# Patient Record
Sex: MALE | Race: WHITE | NOT HISPANIC OR LATINO | Employment: FULL TIME | URBAN - NONMETROPOLITAN AREA
[De-identification: names, ages, dates, MRNs, and addresses within clinical notes are randomized per-mention and may not be internally consistent; named-entity substitution may affect disease eponyms.]

---

## 2017-07-27 ENCOUNTER — TRANSCRIBE ORDERS (OUTPATIENT)
Dept: NUTRITION | Facility: HOSPITAL | Age: 52
End: 2017-07-27

## 2017-07-27 ENCOUNTER — TRANSCRIBE ORDERS (OUTPATIENT)
Dept: ADMINISTRATIVE | Facility: HOSPITAL | Age: 52
End: 2017-07-27

## 2017-07-27 DIAGNOSIS — R55 SYNCOPE AND COLLAPSE: Primary | ICD-10-CM

## 2017-08-08 ENCOUNTER — HOSPITAL ENCOUNTER (OUTPATIENT)
Dept: CARDIOLOGY | Facility: HOSPITAL | Age: 52
Discharge: HOME | End: 2017-08-08
Attending: INTERNAL MEDICINE | Admitting: INTERNAL MEDICINE

## 2017-08-08 ENCOUNTER — HOSPITAL ENCOUNTER (OUTPATIENT)
Dept: CARDIOLOGY | Facility: HOSPITAL | Age: 52
Discharge: HOME | End: 2017-08-08
Attending: INTERNAL MEDICINE

## 2017-08-08 VITALS — DIASTOLIC BLOOD PRESSURE: 72 MMHG | SYSTOLIC BLOOD PRESSURE: 135 MMHG | HEART RATE: 71 BPM

## 2017-08-08 DIAGNOSIS — R55 SYNCOPE AND COLLAPSE: ICD-10-CM

## 2017-08-08 PROCEDURE — 25010000002 PERFLUTREN 6.52 MG/ML SUSPENSION: Performed by: INTERNAL MEDICINE

## 2017-08-08 PROCEDURE — 93352 ADMIN ECG CONTRAST AGENT: CPT | Performed by: INTERNAL MEDICINE

## 2017-08-08 PROCEDURE — 93225 XTRNL ECG REC<48 HRS REC: CPT

## 2017-08-08 PROCEDURE — 93226 XTRNL ECG REC<48 HR SCAN A/R: CPT

## 2017-08-08 PROCEDURE — 93017 CV STRESS TEST TRACING ONLY: CPT

## 2017-08-08 PROCEDURE — 93350 STRESS TTE ONLY: CPT | Performed by: INTERNAL MEDICINE

## 2017-08-08 PROCEDURE — 93018 CV STRESS TEST I&R ONLY: CPT | Performed by: INTERNAL MEDICINE

## 2017-08-08 PROCEDURE — C8928 TTE W OR W/O FOL W/CON,STRES: HCPCS

## 2017-08-08 RX ADMIN — PERFLUTREN 8.48 MG: 6.52 INJECTION, SUSPENSION INTRAVENOUS at 09:09

## 2017-08-09 LAB
BH CV STRESS BP STAGE 1: NORMAL
BH CV STRESS BP STAGE 2: NORMAL
BH CV STRESS BP STAGE 3: NORMAL
BH CV STRESS DURATION MIN STAGE 1: 3
BH CV STRESS DURATION MIN STAGE 2: 3
BH CV STRESS DURATION MIN STAGE 3: 1
BH CV STRESS DURATION SEC STAGE 1: 0
BH CV STRESS DURATION SEC STAGE 2: 0
BH CV STRESS DURATION SEC STAGE 3: 19
BH CV STRESS GRADE STAGE 1: 10
BH CV STRESS GRADE STAGE 2: 12
BH CV STRESS GRADE STAGE 3: 14
BH CV STRESS HR STAGE 1: 114
BH CV STRESS HR STAGE 2: 137
BH CV STRESS HR STAGE 3: 146
BH CV STRESS METS STAGE 1: 5
BH CV STRESS METS STAGE 2: 7.5
BH CV STRESS METS STAGE 3: 10
BH CV STRESS PROTOCOL 1: NORMAL
BH CV STRESS RECOVERY BP: NORMAL MMHG
BH CV STRESS RECOVERY HR: 85 BPM
BH CV STRESS SPEED STAGE 1: 1.7
BH CV STRESS SPEED STAGE 2: 2.5
BH CV STRESS SPEED STAGE 3: 3.4
BH CV STRESS STAGE 1: 1
BH CV STRESS STAGE 2: 2
BH CV STRESS STAGE 3: 3
MAXIMAL PREDICTED HEART RATE: 170 BPM
PERCENT MAX PREDICTED HR: 85.88 %
STRESS BASELINE BP: NORMAL MMHG
STRESS BASELINE HR: 71 BPM
STRESS PERCENT HR: 101 %
STRESS POST ESTIMATED WORKLOAD: 10 METS
STRESS POST EXERCISE DUR MIN: 7 MIN
STRESS POST EXERCISE DUR SEC: 19 SEC
STRESS POST PEAK BP: NORMAL MMHG
STRESS POST PEAK HR: 146 BPM
STRESS TARGET HR: 145 BPM

## 2017-08-11 PROCEDURE — 93227 XTRNL ECG REC<48 HR R&I: CPT | Performed by: INTERNAL MEDICINE

## 2023-01-16 ENCOUNTER — TELEPHONE (OUTPATIENT)
Dept: OTOLARYNGOLOGY | Facility: CLINIC | Age: 58
End: 2023-01-16
Payer: COMMERCIAL

## 2023-01-16 NOTE — TELEPHONE ENCOUNTER
Called to notify the pt of their appt with Otolaryngology (Jc Montano, APRN) 02/07/2023 at 3:30 PM. The pt answered and was given the information.

## 2023-02-07 ENCOUNTER — OFFICE VISIT (OUTPATIENT)
Dept: OTOLARYNGOLOGY | Facility: CLINIC | Age: 58
End: 2023-02-07
Payer: COMMERCIAL

## 2023-02-07 VITALS
TEMPERATURE: 98.2 F | HEIGHT: 73 IN | DIASTOLIC BLOOD PRESSURE: 76 MMHG | RESPIRATION RATE: 16 BRPM | HEART RATE: 77 BPM | BODY MASS INDEX: 33.53 KG/M2 | WEIGHT: 253 LBS | SYSTOLIC BLOOD PRESSURE: 148 MMHG

## 2023-02-07 DIAGNOSIS — H91.92 DECREASED HEARING OF LEFT EAR: ICD-10-CM

## 2023-02-07 DIAGNOSIS — H92.12 OTORRHEA OF LEFT EAR: Primary | ICD-10-CM

## 2023-02-07 PROCEDURE — 92504 EAR MICROSCOPY EXAMINATION: CPT | Performed by: NURSE PRACTITIONER

## 2023-02-07 PROCEDURE — 99203 OFFICE O/P NEW LOW 30 MIN: CPT | Performed by: NURSE PRACTITIONER

## 2023-02-07 RX ORDER — FLUTICASONE PROPIONATE 50 MCG
2 SPRAY, SUSPENSION (ML) NASAL DAILY
Qty: 16 G | Refills: 11 | Status: CANCELLED | OUTPATIENT
Start: 2023-02-07

## 2023-02-07 RX ORDER — CIPROFLOXACIN AND DEXAMETHASONE 3; 1 MG/ML; MG/ML
4 SUSPENSION/ DROPS AURICULAR (OTIC) 4 TIMES DAILY
Qty: 7.5 ML | Refills: 0 | Status: SHIPPED | OUTPATIENT
Start: 2023-02-07

## 2023-02-07 RX ORDER — AZELASTINE 1 MG/ML
2 SPRAY, METERED NASAL 2 TIMES DAILY
Qty: 30 ML | Refills: 11 | Status: CANCELLED | OUTPATIENT
Start: 2023-02-07

## 2023-02-07 NOTE — PROGRESS NOTES
YOB: 1965  Location: [facility]  Location Address: [address] [place] [address], [facility], [address] [place] [address]  Location Phone: [phone]    Chief Complaint   Patient presents with   • Otitis Media     Left ear in December. Had antibiotic but fluid is still on ear.     Has had 6 left ear infections since getting hearing aides in 2018.    • Tinnitus       History of Present Illness  Marquis Baron is a 57 y.o. male.  Marquis Baron is here for evaluation of ENT complaints. The patient has had problems with left ear fullness, ear pressure, and muffled hearing. The patient has had mild to moderate symptoms. The symptoms have been present for the last several years. There have been no identified factors that aggravate the symptoms. There have been no factors that have improved the symptoms.    He reports that he has had 6 ear infections of the left ear since . He reports that it will get clogged during these times. The most recent being 2022. He took two rounds of oral antibiotics but does not feel that the fullness has improved.      He does currently have hearing aids at home.    Past Medical History:   Diagnosis Date   • Diabetes mellitus (HCC)    • HL (hearing loss) 10/01/2018   • Sleep apnea 2009   • Tinnitus 2005       History reviewed. No pertinent surgical history.    No outpatient medications have been marked as taking for the 23 encounter (Office Visit) with Jc Montano APRN.       Sulfa antibiotics    Family History   Problem Relation Age of Onset   • Heart disease Mother    • Diabetes Mother    • Cancer Mother    • Hypertension Mother    • Stroke Mother    • Cancer Father        Social History     Socioeconomic History   • Marital status:    Tobacco Use   • Smoking status: Never   • Smokeless tobacco: Never   Vaping Use   • Vaping Use: Never used   Substance and Sexual Activity   • Alcohol use: Never   • Drug use: Never       Review of Systems    Constitutional: Negative.    HENT: Positive for hearing loss.         Admits ear fullness   Respiratory: Negative.    Cardiovascular: Negative.    Gastrointestinal: Negative.    Genitourinary: Negative.    Neurological: Negative.        Vitals:    02/07/23 1530   BP: 148/76   Pulse: 77   Resp: 16   Temp: 98.2 °F (36.8 °C)       Body mass index is 33.38 kg/m².    Objective     Physical Exam  Vitals reviewed.   Constitutional:       Appearance: Normal appearance. He is obese.   HENT:      Head: Normocephalic and atraumatic.      Right Ear: Tympanic membrane, ear canal and external ear normal. Decreased hearing noted.      Left Ear: External ear normal. Decreased hearing noted. Drainage present.      Ears:      Comments: Bilateral hearing aids  Left ear canal with moderate mucoid drainage-unable to completley visualize left TM     Nose: Nose normal.      Mouth/Throat:      Lips: Pink.      Mouth: Mucous membranes are moist.      Pharynx: Oropharynx is clear. Uvula midline.   Musculoskeletal:      Cervical back: Full passive range of motion without pain.   Neurological:      Mental Status: He is alert.   Psychiatric:         Behavior: Behavior is cooperative.       Ear Microscopy    Date/Time: 2/7/2023 4:10 PM  Performed by: Jc Montano APRN  Authorized by: Jc Montano APRN     Ear examination was performed utilizing binocular microscopy.  Right auricle:   normal:   Left auricle:   normal:   Left ear canal:   drainage. drainage details: moderate, mucoid.   Left tympanic membrane:   unable to completely visualize.     Post-procedure details:     Ciprodex was applied to the ear canal.    The procedure was tolerated well, no immediate complications.      Assessment & Plan   Problems Addressed this Visit    None  Visit Diagnoses     Otorrhea of left ear    -  Primary    Decreased hearing of left ear          Diagnoses       Codes Comments    Otorrhea of left ear    -  Primary ICD-10-CM: H92.12  ICD-9-CM: 388.60      Decreased hearing of left ear     ICD-10-CM: H91.92  ICD-9-CM: 389.9         * Surgery not found *  Orders Placed This Encounter   Procedures   • $ Binocular Microscopy     This order was created via procedure documentation     Order Specific Question:   Release to patient     Answer:   Routine Release     ciprodex to the left ear as directed  Recommend hearing test once cleared  Call with any new/worsening problems or concerns    Return in about 4 weeks (around 3/7/2023) for Recheck.       Patient Instructions   Ciprodex to the left ear as directed  Recommend hearing test once cleared  Call with any new/worsening problems or concerns    CONTACT INFORMATION:  The main office phone number is [phone]. For emergencies after hours and on weekends, this number will convert over to our answering service and the on call provider will answer. Please try to keep non emergent phone calls/ questions to office hours 9am-5pm Monday through Friday.      [employer]  As an alternative, you can sign up and use the Epic MyChart system for more direct and quicker access for non emergent questions/ problems.  [employer] allows you to send messages to your doctor, view your test results, renew your prescriptions, schedule appointments, and more. To sign up, go to [employer] and click on the Sign Up Now link in the New User? box. Enter your [employer] Activation Code exactly as it appears below along with the last four digits of your Social Security Number and your Date of Birth () to complete the sign-up process. If you do not sign up before the expiration date, you must request a new code.     [employer] Activation Code: Activation code not generated  Current [employer] Status: Active     If you have questions, you can email [email or web address] or call [phone] to talk to our [employer] staff. Remember, [employer] is NOT to be used for urgent needs. For medical emergencies, dial 911.     IF YOU SMOKE OR  USE TOBACCO PLEASE READ THE FOLLOWING:  Why is smoking bad for me?  Smoking increases the risk of heart disease, lung disease, vascular disease, stroke, and cancer. If you smoke, STOP!        IF YOU SMOKE OR USE TOBACCO PLEASE READ THE FOLLOWING:  Why is smoking bad for me?  Smoking increases the risk of heart disease, lung disease, vascular disease, stroke, and cancer. If you smoke, STOP!     For more information:  Quit Now [place]  1-800-QUIT-NOW  https://[place].quitlogix.org/en-US/

## 2023-02-07 NOTE — PATIENT INSTRUCTIONS
Ciprodex to the left ear as directed  Recommend hearing test once cleared  Call with any new/worsening problems or concerns    CONTACT INFORMATION:  The main office phone number is [phone]. For emergencies after hours and on weekends, this number will convert over to our answering service and the on call provider will answer. Please try to keep non emergent phone calls/ questions to office hours 9am-5pm Monday through Friday.      [employer]  As an alternative, you can sign up and use the Epic MyChart system for more direct and quicker access for non emergent questions/ problems.  [employer] allows you to send messages to your doctor, view your test results, renew your prescriptions, schedule appointments, and more. To sign up, go to [employer] and click on the Sign Up Now link in the New User? box. Enter your [employer] Activation Code exactly as it appears below along with the last four digits of your Social Security Number and your Date of Birth () to complete the sign-up process. If you do not sign up before the expiration date, you must request a new code.     [employer] Activation Code: Activation code not generated  Current [employer] Status: Active     If you have questions, you can email [email or web address] or call [phone] to talk to our [employer] staff. Remember, [employer] is NOT to be used for urgent needs. For medical emergencies, dial 911.     IF YOU SMOKE OR USE TOBACCO PLEASE READ THE FOLLOWING:  Why is smoking bad for me?  Smoking increases the risk of heart disease, lung disease, vascular disease, stroke, and cancer. If you smoke, STOP!        IF YOU SMOKE OR USE TOBACCO PLEASE READ THE FOLLOWING:  Why is smoking bad for me?  Smoking increases the risk of heart disease, lung disease, vascular disease, stroke, and cancer. If you smoke, STOP!     For more information:  Quit Now [place]  -QUIT-NOW  https://[place].quitlogix.org/en-US/

## 2023-02-27 RX ORDER — FLUTICASONE PROPIONATE 50 MCG
2 SPRAY, SUSPENSION (ML) NASAL DAILY
Qty: 16 G | Refills: 11 | Status: SHIPPED | OUTPATIENT
Start: 2023-02-27

## 2023-02-27 RX ORDER — AZELASTINE 1 MG/ML
2 SPRAY, METERED NASAL 2 TIMES DAILY
Qty: 30 ML | Refills: 11 | Status: SHIPPED | OUTPATIENT
Start: 2023-02-27

## 2023-08-07 RX ORDER — AZELASTINE 1 MG/ML
2 SPRAY, METERED NASAL 2 TIMES DAILY
Qty: 30 ML | Refills: 11 | Status: SHIPPED | OUTPATIENT
Start: 2023-08-07

## 2023-08-07 RX ORDER — FLUTICASONE PROPIONATE 50 MCG
2 SPRAY, SUSPENSION (ML) NASAL DAILY
Qty: 16 G | Refills: 11 | Status: SHIPPED | OUTPATIENT
Start: 2023-08-07

## 2023-09-13 ENCOUNTER — OFFICE VISIT (OUTPATIENT)
Dept: OTOLARYNGOLOGY | Facility: CLINIC | Age: 58
End: 2023-09-13
Payer: COMMERCIAL

## 2023-09-13 ENCOUNTER — PROCEDURE VISIT (OUTPATIENT)
Dept: OTOLARYNGOLOGY | Facility: CLINIC | Age: 58
End: 2023-09-13
Payer: COMMERCIAL

## 2023-09-13 VITALS
TEMPERATURE: 98.4 F | HEIGHT: 73 IN | BODY MASS INDEX: 31.14 KG/M2 | HEART RATE: 77 BPM | WEIGHT: 235 LBS | DIASTOLIC BLOOD PRESSURE: 76 MMHG | SYSTOLIC BLOOD PRESSURE: 137 MMHG

## 2023-09-13 DIAGNOSIS — H90.A21 SENSORINEURAL HEARING LOSS (SNHL) OF RIGHT EAR WITH RESTRICTED HEARING OF LEFT EAR: ICD-10-CM

## 2023-09-13 DIAGNOSIS — H92.12 OTORRHEA OF LEFT EAR: ICD-10-CM

## 2023-09-13 DIAGNOSIS — H93.13 TINNITUS OF BOTH EARS: ICD-10-CM

## 2023-09-13 DIAGNOSIS — H90.A32 MIXED CONDUCTIVE AND SENSORINEURAL HEARING LOSS OF LEFT EAR WITH RESTRICTED HEARING OF RIGHT EAR: Primary | ICD-10-CM

## 2023-09-13 DIAGNOSIS — H91.92 DECREASED HEARING OF LEFT EAR: Primary | ICD-10-CM

## 2023-09-13 RX ORDER — CIPROFLOXACIN AND DEXAMETHASONE 3; 1 MG/ML; MG/ML
4 SUSPENSION/ DROPS AURICULAR (OTIC) 4 TIMES DAILY
Qty: 7.5 ML | Refills: 0 | Status: SHIPPED | OUTPATIENT
Start: 2023-09-13

## 2023-09-13 RX ORDER — MULTIVIT WITH MINERALS/LUTEIN
250 TABLET ORAL DAILY
COMMUNITY

## 2023-09-13 RX ORDER — ERGOCALCIFEROL 1.25 MG/1
50000 CAPSULE ORAL WEEKLY
COMMUNITY

## 2023-09-13 NOTE — PROGRESS NOTES
YOB: 1965  Location: [facility]  Location Address: [address] [place] [address], [facility] [address], [address] [place] [address]  Location Phone: [phone]    Chief Complaint   Patient presents with    Ear Problem     Pt complains of some fullness and that his left ear feel clogged since root canal       History of Present Illness  Marquis Baron is a 58 y.o. male.  Marquis Baron is here for follow up of ENT complaints. The patient has had problems with left ear fullness and decreased hearing   Patient states he was diagnosed by pcp with ear infection in December and symptoms did not clear with antibiotics. He was seen here and found to have significant mucoid drainage   He used nasal sprays x 2 weeks then stopped    Procedure visit with Rosa Haddad AUD (2023)      Past Medical History:   Diagnosis Date    Diabetes mellitus     HL (hearing loss) 10/01/2018    Sleep apnea 2009    Tinnitus 2005       Past Surgical History:   Procedure Laterality Date    DENTAL PROCEDURE      wisdon teeth and root canal       Outpatient Medications Marked as Taking for the 23 encounter (Office Visit) with Rani Frazier APRN   Medication Sig Dispense Refill    ciprofloxacin-dexAMETHasone (CIPRODEX) 0.3-0.1 % otic suspension Administer 4 drops into the left ear 4 (Four) Times a Day. 7.5 mL 0    Testosterone 20 % cream Use.      vitamin C (ASCORBIC ACID) 250 MG tablet Take 1 tablet by mouth Daily.      vitamin D (ERGOCALCIFEROL) 1.25 MG (23971 UT) capsule capsule Take 1 capsule by mouth 1 (One) Time Per Week.      Zinc Sulfate (ZINC 15 PO) Take  by mouth.         Ciprofloxacin, Rosuvastatin, and Sulfa antibiotics    Family History   Problem Relation Age of Onset    Heart disease Mother     Diabetes Mother     Cancer Mother     Hypertension Mother     Stroke Mother     Cancer Father        Social History     Socioeconomic History    Marital status:    Tobacco Use    Smoking status: Never    Smokeless  tobacco: Never   Vaping Use    Vaping Use: Never used   Substance and Sexual Activity    Alcohol use: Never    Drug use: Never       Review of Systems   Constitutional: Negative.    HENT:  Positive for ear discharge, ear pain and hearing loss.      Vitals:    09/13/23 1348   BP: 137/76   Pulse: 77   Temp: 98.4 °F (36.9 °C)       Body mass index is 31 kg/m².    Objective     Physical Exam  Vitals reviewed.   Constitutional:       Appearance: Normal appearance. He is obese.   HENT:      Head: Normocephalic.      Right Ear: Tympanic membrane, ear canal and external ear normal.      Ears:      Comments: Left ear with cerumen and squamous debris       Nose: Nose normal.      Mouth/Throat:      Lips: Pink.      Mouth: Mucous membranes are moist.      Pharynx: Uvula midline.   Neurological:      Mental Status: He is alert.     Ear Microscopy with Left Cerumen Removal    Date/Time: 9/14/2023 1:12 PM  Performed by: Rani Frazier APRN  Authorized by: Rani Frazier APRN     Ear examination was performed utilizing binocular microscopy.  Right auricle:   normal:   Right ear canal:   normal:   Right tympanic membrane:   normal:   Left ear canal:   impacted cerumen, squamous debris.     Procedure:    Cerumen was removed from the left ear with a curette and a suction.   Post-procedure details:     Inspection after the procedure revealed macerated skin.    No medication was applied to the ear canal.    The procedure was tolerated well, no immediate complications.  Comments:      Cerumen partially removed  Tm partially visualized  Macerated and edematous canal       Dr. Prieto has examined and assessed the patient and agrees with current treatment plan     Assessment & Plan   Diagnoses and all orders for this visit:    1. Otorrhea of left ear (Primary)  -     Comprehensive Hearing Test; Future    2. Decreased hearing of left ear  -     Comprehensive Hearing Test; Future    Other orders  -     ciprofloxacin-dexAMETHasone  (CIPRODEX) 0.3-0.1 % otic suspension; Administer 4 drops into the left ear 4 (Four) Times a Day.  Dispense: 7.5 mL; Refill: 0  -     Cancel: Ear Cerumen Removal  -     $ Binocular Microscopy      * Surgery not found *  Orders Placed This Encounter   Procedures    $ Binocular Microscopy     This order was created via procedure documentation     Order Specific Question:   Release to patient     Answer:   Routine Release [[id number]]    Comprehensive Hearing Test     Standing Status:   Future     Standing Expiration Date:   9/14/2024     Order Specific Question:   Laterality     Answer:   Bilateral     Order Specific Question:   Release to patient     Answer:   Routine Release [[id number]]     Return in about 3 months (around 12/13/2023).     Nasal sprays as directed   Ear drops to left ear  No q tip use     Patient Instructions   For the best response, use your nasal sprays every day without skipping doses. It may take several weeks before the full effect is acheived.

## 2023-09-13 NOTE — PROGRESS NOTES
AUDIOMETRIC EVALUATION      Name:  Marquis Baron  :  1965  Age:  58 y.o.  Date of Evaluation:  2023       History:  Reason for visit:  Mr. Baron is seen today at the request of LUNA Crystal for a hearing evaluation. Patient was seen today  by ENT provider with complaints of left ear fullness and decreased hearing. He currently wears hearings aids that he purchased from [employer] in 2018. That was the same time he had his most recent hearing test. He states he has a high frequency hearing loss, bilaterally. He explained he has been having issues with an ear infection in the left ear since 2022.    PE Tubes:  no, both ears   Other otologic surgical history: no, both ears  Tinnitus:  yes, constant ringing in both ears, worse in the left ear  Dizziness:  no  Noise Exposure: yes, occupational noise without hearing protection when younger, guns (left-handed) with hearing protection.  Aural Fullness:  no, both ears  Otalgia: no, both ears  Family history of hearing loss: yes, father  Other significant history:  cpap use  Head trauma requiring hospital stay: no  Previous brain MRI: no      EVALUATION:        RESULTS:    Otoscopic Evaluation:  Right: minimal cerumen, tympanic membrane visualized  Left: drainage in canal, tympanic membrane partially visualized         NOTE: Testing completed after ears were examined and cleaned by ENT provider    Tympanometry (226 Hz):  Right: Type A- normal  Left: Type As- low static compliance    Pure Tone Audiometry (via inserts with good reliability):    Right: normal sloping to moderately severe sensorineural hearing loss  Left: normal precipitously sloping to severe mixed hearing loss    Speech Audiometry:   Right: Speech Reception Threshold (SRT) was obtained at 40 dBHL  Word Recognition scores-  96% (excellent/within normal limits, %)   Presented at 80dBHL with 60dB of masking in opposite ear  Using NU-6 List 4A, 25 words  Left: Speech Reception  Threshold (SRT) was obtained at 40 dBHL  Word Recognition scores-  92% (excellent/within normal limits, %)   Presented at 80dBHL with 60dB of masking in opposite ear  Using NU-6 List 4A, 25 words    IMPRESSIONS:  Tympanometry showed normal middle ear pressure and static compliance, for the right ear. Tympanometry showed normal middle ear pressure with decreased static compliance, consistent with hypomobile tympanic membrane, for the left ear. Pure tone thresholds for the right ear show a normal sloping to moderately severe sensorineural hearing loss, suggesting normal outer/middle ear function and abnormal cochlear/retrocochlear function. Pure tone thresholds for the left ear show a normal precipitously sloping to severe mixed hearing loss, suggesting abnormal outer/middle ear function and abnormal cochlear/retrocochlear function. Asymmetry present with the right ear bone threshold at 1kHz significantly worse than the left. Patient was counseled with regard to the findings.    Amplification needs:  Patient currently wears hearing aids.    Diagnosis:  1. Mixed conductive and sensorineural hearing loss of left ear with restricted hearing of right ear    2. Sensorineural hearing loss (SNHL) of right ear with restricted hearing of left ear    3. Tinnitus of both ears         RECOMMENDATIONS/PLAN:  Follow-up recommendations per LUNA Crystal    Audiologic follow-up after medical intervention or in 3 months  Return for audiologic testing if noticing changes in hearing or concerns arise  Use communication strategies  Use hearing protection around loud noises  Avoid silence when possible. Sleep with white noise/fan, or listen to nature sounds     EDUCATION:  Discussed results and recommendations with patient. Questions were addressed and the patient was encouraged to contact our department should concerns arise.        Stefany Jason [facility]  Licensed Audiologist

## 2023-09-14 DIAGNOSIS — H90.A32 MIXED CONDUCTIVE AND SENSORINEURAL HEARING LOSS OF LEFT EAR WITH RESTRICTED HEARING OF RIGHT EAR: Primary | ICD-10-CM

## 2023-09-14 DIAGNOSIS — H93.13 TINNITUS OF BOTH EARS: ICD-10-CM

## 2023-09-14 DIAGNOSIS — H92.12 OTORRHEA OF LEFT EAR: ICD-10-CM

## 2023-09-14 DIAGNOSIS — H90.A21 SENSORINEURAL HEARING LOSS (SNHL) OF RIGHT EAR WITH RESTRICTED HEARING OF LEFT EAR: ICD-10-CM

## 2023-09-14 DIAGNOSIS — H91.92 DECREASED HEARING OF LEFT EAR: ICD-10-CM
